# Patient Record
Sex: MALE | Race: WHITE | NOT HISPANIC OR LATINO | Employment: OTHER | URBAN - METROPOLITAN AREA
[De-identification: names, ages, dates, MRNs, and addresses within clinical notes are randomized per-mention and may not be internally consistent; named-entity substitution may affect disease eponyms.]

---

## 2018-08-13 ENCOUNTER — APPOINTMENT (EMERGENCY)
Dept: RADIOLOGY | Facility: HOSPITAL | Age: 58
End: 2018-08-13
Payer: MEDICARE

## 2018-08-13 ENCOUNTER — APPOINTMENT (EMERGENCY)
Dept: CT IMAGING | Facility: HOSPITAL | Age: 58
End: 2018-08-13
Payer: MEDICARE

## 2018-08-13 ENCOUNTER — HOSPITAL ENCOUNTER (EMERGENCY)
Facility: HOSPITAL | Age: 58
Discharge: HOME | End: 2018-08-13
Attending: EMERGENCY MEDICINE | Admitting: EMERGENCY MEDICINE
Payer: MEDICARE

## 2018-08-13 VITALS
BODY MASS INDEX: 30.38 KG/M2 | HEART RATE: 80 BPM | DIASTOLIC BLOOD PRESSURE: 83 MMHG | SYSTOLIC BLOOD PRESSURE: 182 MMHG | RESPIRATION RATE: 18 BRPM | WEIGHT: 193.56 LBS | OXYGEN SATURATION: 98 % | TEMPERATURE: 98.1 F | HEIGHT: 67 IN

## 2018-08-13 DIAGNOSIS — S00.83XA FACIAL CONTUSION: ICD-10-CM
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
## 2018-08-13 PROCEDURE — 70450 CT HEAD/BRAIN W/O DYE: CPT

## 2018-08-13 PROCEDURE — 70486 CT MAXILLOFACIAL W/O DYE: CPT

## 2018-08-13 PROCEDURE — 73130 X-RAY EXAM OF HAND: CPT

## 2018-08-13 PROCEDURE — 99284 EMERGENCY DEPT VISIT MOD MDM: CPT

## 2018-08-13 NOTE — ED PROVIDER NOTES
Pt Name: Jose Juan Tripp  MRN: [id number]  [place] 1960  Age/Sex: 61 y o  male  Date of evaluation: 8/13/2018  PCP: No primary care provider on file  CHIEF COMPLAINT    Chief Complaint   Patient presents with   Aden Luis Antonio Fall     tripped last night over his bed and hit face         HPI    Jose Melendez presents to the Emergency Department complaining of pain and swelling after a fall  Yesterday at camp he tripped and fell  This was witnessed and he seemed ok afterward but this morning he has significant swelling on his face as well as bruising  He also complained of right hand pain as well  History provided by:  Caregiver  History limited by: Patient with intellectual disability/ MR   used: No          Past Medical and Surgical History    Past Medical History:   Diagnosis Date    Autism     Bipolar 1 disorder ([facility] [place] [address] )     OCD (obsessive compulsive disorder)     Renal disease        History reviewed  No pertinent surgical history  History reviewed  No pertinent family history  Social History   Substance Use Topics    Smoking status: Never Smoker    Smokeless tobacco: Never Used    Alcohol use No              Allergies    Allergies   Allergen Reactions    Allopurinol Rash       Home Medications    Prior to Admission medications    Not on File           Review of Systems    Review of Systems   Constitutional: Negative for activity change, appetite change, chills, fatigue and fever  HENT: Negative for congestion, rhinorrhea, sinus pressure, sneezing, sore throat and trouble swallowing  Eyes: Negative for photophobia and visual disturbance  Respiratory: Negative for chest tightness, shortness of breath and wheezing  Cardiovascular: Negative for chest pain and leg swelling  Gastrointestinal: Negative for abdominal distention, abdominal pain, constipation, diarrhea, nausea and vomiting  Endocrine: Negative for polydipsia, polyphagia and polyuria     Genitourinary: Negative for decreased urine volume, difficulty urinating, dysuria, flank pain, frequency and urgency  Musculoskeletal: Negative for back pain, gait problem, joint swelling and neck pain  Skin: Negative for color change, pallor and rash  Allergic/Immunologic: Negative for immunocompromised state  Neurological: Negative for seizures, syncope, speech difficulty, weakness, light-headedness and headaches  Psychiatric/Behavioral: Negative for confusion  All other systems reviewed and are negative  Physical Exam      ED Triage Vitals [08/13/18 1029]   Temperature Pulse Respirations Blood Pressure SpO2   98 1 °F (36 7 °C) 80 18 (!) 182/83 98 %      Temp src Heart Rate Source Patient Position - Orthostatic VS BP Location FiO2 (%)   -- -- -- -- --      Pain Score       --               Physical Exam   Constitutional: He is oriented to person, place, and time  He appears well-developed and well-nourished  No distress  HENT:   Head: Normocephalic  Head is with contusion  Nose: Nose normal    Mouth/Throat: Oropharynx is clear and moist    Eyes: Conjunctivae and EOM are normal  Pupils are equal, round, and reactive to light  Neck: Normal range of motion  Neck supple  Cardiovascular: Normal rate, regular rhythm and normal heart sounds  Exam reveals no gallop and no friction rub  No murmur heard  Pulmonary/Chest: Effort normal and breath sounds normal  No respiratory distress  He has no wheezes  He has no rales  Abdominal: Soft  Bowel sounds are normal  There is no tenderness  There is no rebound and no guarding  Musculoskeletal: Normal range of motion  Right hand: He exhibits tenderness and swelling  Hands:  Neurological: He is alert and oriented to person, place, and time  Skin: Skin is warm and dry  He is not diaphoretic  Psychiatric: He has a normal mood and affect  His behavior is normal    Nursing note and vitals reviewed          Assessment and Plan    Travis Betancourt is a 61 y o  male who presents with facial swelling from a fall yesterday  Physical examination remarkable for swelling and abrasion/ ecchymosis  Differential diagnosis (not completely inclusive) includes traumatic injury  Plan will be to perform diagnostic testing and treat symptomatically  [facility]    Diagnostic Results      Labs:    No results found for this or any previous visit  All labs reviewed and utilized in the medical decision making process    Radiology:    CT facial bones without contrast   Final Result      No acute facial bone fracture  Mild bifrontal soft tissue hematoma extending to the soft tissues overlying the nasal bones  Workstation performed: [id number]         CT head without contrast   Final Result      No acute intracranial abnormality  Bilateral frontal soft tissue hematoma  Chronic microvascular ischemic changes  Workstation performed: [id number]         XR hand 3+ views RIGHT    (Results Pending)     No obvious fracture    All radiology studies independently viewed by me and interpreted by the radiologist     Procedure    Procedures    CritCare Time      ED Course of Care and Re-Assessments        Medications - No data to display        FINAL IMPRESSION    Final diagnoses:   Fall   Facial contusion         DISPOSITION/PLAN    Time reflects when diagnosis was documented in both MDM as applicable and the Disposition within this note     Time User Action Codes Description Comment    8/13/2018 12:07 PM Emily Small Add [V57  XXXA] Fall     8/13/2018 12:07 PM Emily Small Add [S00 [id number]] Facial contusion       ED Disposition     ED Disposition Condition Comment    Discharge  Howard Sicks discharge to home/self care      Condition at discharge: Good        Follow-up Information     Follow up With Specialties Details Why [facility] Emergency Department Emergency Medicine Go to As needed, If symptoms worsen [address] [place] [address] [place] [address]  [phone] [place] ED, [address] [place], [place], [facility]            PATIENT REFERRED TO:    [facility] Emergency Department  [address] [place] [address]  [phone]  Go to  As needed, If symptoms worsen             Antonia Horse, DO     Antonia Horse, DO  08/13/18 1243

## 2018-08-13 NOTE — DISCHARGE INSTRUCTIONS
